# Patient Record
Sex: MALE | Race: WHITE | NOT HISPANIC OR LATINO | URBAN - METROPOLITAN AREA
[De-identification: names, ages, dates, MRNs, and addresses within clinical notes are randomized per-mention and may not be internally consistent; named-entity substitution may affect disease eponyms.]

---

## 2017-06-26 ENCOUNTER — TELEPHONE (OUTPATIENT)
Dept: PEDIATRICS | Facility: CLINIC | Age: 47
End: 2017-06-26

## 2017-06-26 NOTE — TELEPHONE ENCOUNTER
Pt. Wondering if he can switch back to oral narcotic pain  medication as he feels he not getting much benefit out of the Suboxone for his LBP. Suboxone was prescribed by an  [facility] pain specialist. Triage asked if he discussed this with him. He stated yes but they are asking him to hold off a little longer.    Triage advised patient he should schedule an OV with PCP to discuss further; He stated he is not sure if he should schedule incase he may be turned away for asking for pain medication.     Triaged encouraged him to discuss with the [facility] provider for his options or to come in and discuss with a provider. He declined scheduling at this time.    He stated he will call back after thinking about his options and hung up.      Shiloh LAIRD RN, BSN, PHN  [facility]

## 2017-12-22 ENCOUNTER — NURSE TRIAGE (OUTPATIENT)
Dept: NURSING | Facility: CLINIC | Age: 47
End: 2017-12-22

## 2017-12-22 NOTE — TELEPHONE ENCOUNTER
"Patient states he had \"major back surgery\" done at [facility] [place] and his PCP told him that he can go to any ER to get an epidural done for back pain. I advised the it is up to the provider he sees in the ER to determine treatment or referrals needed and he would need to be seen in order to get that information. Verbalized understanding.  Abigail Loyd RN  [facility]    "

## 2018-10-22 ENCOUNTER — TELEPHONE (OUTPATIENT)
Dept: PEDIATRICS | Facility: CLINIC | Age: 48
End: 2018-10-22

## 2018-10-22 DIAGNOSIS — G89.29 CHRONIC LEFT-SIDED LOW BACK PAIN WITH LEFT-SIDED SCIATICA: Primary | ICD-10-CM

## 2018-10-22 DIAGNOSIS — M54.42 CHRONIC LEFT-SIDED LOW BACK PAIN WITH LEFT-SIDED SCIATICA: Primary | ICD-10-CM

## 2018-10-22 NOTE — TELEPHONE ENCOUNTER
Forwarded to triage nursing to investigate in more detail.  I haven't seen patient for 2 years.   Does [facility] need a referral from me?      Heidi Perez MD  Internal Medicine - Pediatrics

## 2018-10-22 NOTE — TELEPHONE ENCOUNTER
Yes he does need a referral from a [facility] Dr. This is the information from the  Pain clinic. He needs a evaluation done by them.  Please call the pt when it is in.    Home Phone [phone]   Mobile [phone]

## 2018-10-22 NOTE — TELEPHONE ENCOUNTER
Patient spoke with the  Pain Clinic who informed him that he needs a referral from one of our providers for this. Patient received a referral from his provider, Dr. Alvarado at [facility] but  Pain wouldn't accept that. Able to review notes from Dr. Alvarado's ov on 10/19/18 stating need for pain clinic referral. Please call patient back with questions/concerns.  -Emily Coelho

## 2018-10-30 ENCOUNTER — TELEPHONE (OUTPATIENT)
Dept: PALLIATIVE MEDICINE | Facility: CLINIC | Age: 48
End: 2018-10-30

## 2018-10-30 NOTE — TELEPHONE ENCOUNTER
Huddled with Dr. Ana Kern for referral.     Entered referral. Left voicemail updating patient that referral was entered and to call [phone] to inquire about an appointment.

## 2018-10-30 NOTE — TELEPHONE ENCOUNTER
[facility] Management Center Referral      1. Confirmed address with patient? Yes  2. Confirmed phone number with patient? Yes  3. Confirmed referring provider? Yes  4. Is the PCP the same as the referring provider? Yes  5. Has the patient been to any previous pain clinics? Yes  (If yes, send ANITRA with welcome letter)  6. Which insurance are we to bill for this appointment?  HP    7. Informed pt of cancellation (48 hour) policy? Yes    REGARDING OPIOID MEDICATIONS: We will always address appropriateness of opioid pain medications, but we generally will not automatically take on a prescribing role. When we do take on prescribing of opioids for chronic pain, it is in collaboration with the referring physician for an intermediate period of time (months), with an expectation that the primary physician or provider will assume the prescribing role if medications are effective at stable doses with demonstrated compliance. Therefore, please do not assume that your prescribing responsibilities end on the day of pain clinic consultation.  7. Informed pt of prescribing policy? Yes      8. Referring Provider: Heidi Perez     9. Criteria for Triage Eval:   -Missed/Failed 1st DUAL appointment? N/A   -Medication Focused? N/A   -Mental Health Concerns? (e.g. Recent psych hospitalization/snap shot)? N/A   -Active substance abuse? N/A   -Patient behaviors (e.g. Offensive language/raised voice)? N/A      Julien MARTINEZ    [facility]

## 2018-11-02 ENCOUNTER — OFFICE VISIT (OUTPATIENT)
Dept: URGENT CARE | Facility: URGENT CARE | Age: 48
End: 2018-11-02
Payer: COMMERCIAL

## 2018-11-02 VITALS
TEMPERATURE: 97.7 F | HEART RATE: 79 BPM | SYSTOLIC BLOOD PRESSURE: 124 MMHG | DIASTOLIC BLOOD PRESSURE: 80 MMHG | OXYGEN SATURATION: 97 %

## 2018-11-02 DIAGNOSIS — G89.29 OTHER CHRONIC BACK PAIN: Primary | ICD-10-CM

## 2018-11-02 DIAGNOSIS — M54.89 OTHER CHRONIC BACK PAIN: Primary | ICD-10-CM

## 2018-11-02 NOTE — MR AVS SNAPSHOT
"              After Visit Summary   11/2/2018    Nikolay Barnes    MRN: [id number]           Patient Information     Date Of Birth          1970        Visit Information        Provider Department      11/2/2018 12:35 PM Isak White MD [facility]        Today's Diagnoses     Other chronic back pain    -  1       Follow-ups after your visit        Your next 10 appointments already scheduled     Nov 07, 2018 10:00 AM CST   New Visit with Sophie Samuels MD   [facility] ([facility])    [address] [address] [address]   [phone]              Who to contact     If you have questions or need follow up information about today's clinic visit or your schedule please contact [facility] directly at [phone].  Normal or non-critical lab and imaging results will be communicated to you by MyChart, letter or phone within 4 business days after the clinic has received the results. If you do not hear from us within 7 days, please contact the clinic through MyChart or phone. If you have a critical or abnormal lab result, we will notify you by phone as soon as possible.  Submit refill requests through [employer] or call your pharmacy and they will forward the refill request to us. Please allow 3 business days for your refill to be completed.          Additional Information About Your Visit        MyChart Information     [employer] lets you send messages to your doctor, view your test results, renew your prescriptions, schedule appointments and more. To sign up, go to [email or web address] . Click on \"Log in\" on the left side of the screen, which will take you to the Welcome page. Then click on \"Sign up Now\" on the right side of the page.     You will be asked to enter the access code listed below, as well as some personal information. Please follow the directions to create your username and password.     Your access code is: " [email or web address]  Expires: 2019  6:46 PM     Your access code will  in 90 days. If you need help or a new code, please call your [facility] clinic or [phone].        Care EveryWhere ID     This is your Care EveryWhere ID. This could be used by other organizations to access your [facility] medical records  [id number]        Your Vitals Were     Pulse Temperature Pulse Oximetry             79 97.7  F (36.5  C) (Tympanic) 97%          Blood Pressure from Last 3 Encounters:   18 124/80   10/06/16 128/78   16 94/58    Weight from Last 3 Encounters:   10/06/16 151 lb (68.5 kg)   16 150 lb (68 kg)   07/15/16 150 lb (68 kg)              Today, you had the following     No orders found for display       Primary Care Provider Office Phone # Fax #    Heidi Perez [phone] [address] [place] [address]        Equal Access to Services     [facility]: Hadii aad ku hadasho Soomaali, waaxda luqadaha, qaybta kaalmada adeegyada, waxay virginiain hayjammie estes . So [facility] [phone].    ATENCIÓN: Si habla español, tiene a rios disposición servicios gratuitos de asistencia lingüística. Llame al [phone].    We comply with applicable federal civil rights laws and [place] laws. We do not discriminate on the basis of race, color, national origin, age, disability, sex, sexual orientation, or gender identity.            Thank you!     Thank you for choosing [facility]  for your care. Our goal is always to provide you with excellent care. Hearing back from our patients is one way we can continue to improve our services. Please take a few minutes to complete the written survey that you may receive in the mail after your visit with us. Thank you!             Your Updated Medication List - Protect others around you: Learn how to safely use, store and throw away your medicines at www.disposemymeds.org.          This list is accurate as of  11/2/18  6:46 PM.  Always use your most recent med list.                   Brand Name Dispense Instructions for use Diagnosis    traZODone 50 MG tablet    DESYREL    60 tablet    Take 1-2 tablets ( mg) by mouth nightly as needed for sleep    Persistent insomnia

## 2018-11-02 NOTE — PROGRESS NOTES
THIS IS A TRIAGE ENCOUNTER:  Nikolay Barnes is a 48 year old male--he has been seeing a physician at the [facility] for chronic pain management for the past 1.5-2 years--with a h/o chronic left low back pain with left sided sciatica and lumbosacral radiculopathy at L5 with a history of chronic opioid use for at least 15 years.   He is requesting strong pain medications.  Dr. Emre Alvarado, from the [facility], has been prescribing the patient Suboxone for the past 1.5-2 years.  This medication helped for the past 1.5-2 years but patient has not noticed much pain relief over the past couple months.      On October 19, 2018, patient was given a Rx for Tramadol 50 mg PO Q6 hours PRN pain. This prescription was written by Dr. Alvarado.  Patient ran out of the 28 tablets.  According to the patient, this Tramadol has not been effective to reduce the pain.      Patient will contact Dr. Emre Alvarado today at the [facility] for the pain, since he signed a controlled substance agreement at that clinic on October 26, 2016.      Isak White MD

## 2018-11-06 NOTE — PROGRESS NOTES
"                      [facility] Consultation    Date of visit: 11/6/2018    Reason for consultation:    Nikolay Barnes is a 48 year old male who is seen in consultation today at the request of his primary care physician, Heidi Perez     Consultation and Evaluation for: chronic back pain    Review of [place] Prescription Monitoring Program (): Today I have also reviewed the patient's history of controlled substance use, as provided by [place] licensed pharmacies and prescriber dispensers. No inconsistencies noted.     Review of Pain Questionnaire: Please see the [facility] health questionnaire, which the patient completed and reviewed with me in detail.    Review of Electronic Chart: Today I have also reviewed available medical information in the patient's medical record at [facility] ([facility]), including relevant provider notes, laboratory work, and imaging.     Nikolay Barnes has been seen at a pain clinic in the past. He has been to [facility] and [facility].     Chief Complaint:    Chief Complaint   Patient presents with     Pain     Pain history:  Nikolay Barnes is a 48 year old male who presents for initial evaluation of chief pain complaint of back pain.     Pain initially started in 2008. He had a laminectomy and discectomy at L5-S1 level. At that time he was having back and left lower extremity pain. He was doing well in terms of pain until a work injury in 2012 which resulted in a second disc herniation. It was recommended at that time that he have another surgery but he declined. From 2008 to around 2016 he was managing his pain with opioids and injections (tried epidurals and MBB). The epidurals provided variable benefit for him. The MBB didn't provide relief. He states that he did extensive research and thought that suboxone would be a better option for him to manage his pain instead of the opioids he was on. Per chart review \"He has tried a " "friend's suboxone in the past when he had run out of pain medications and it really took the withdrawal away, he took it for 3 days up to 12 mg per day, and also found that it really helped his pain\". He has been on suboxone for the past 1.5 to 2 years. He states now that the suboxone has not been working well to control his pain. He has tried various doses of suboxone and has found that it does not work well for him and that he needs to try something else. He recently had a prescription for Tramadol on 10/19/208 but this has not helped with pain relief. Today he states that he has done more research and he thinks that he should try methadone to see if it will provide more pain relief for him.       The back pain is axial in nature. He denies having any radiation into the lower extremities. It is constant. It is aching in quality. At its worst it is a 10/10 in severity, at best 5/10 in severity and on average is 8/10 in severity. Aggravated by sitting for a long time, staying in any one position for too long, working. Improved with resting. He reports he is getting behind at work because of the pain.      Red Flags: The patient denies bowel or bladder incontinence, parasthesias, weakness, saddle anesthesia, unintentional weight loss, or fever/chills/sweats.       Pain Treatments:  1. Medications:       Current pain medications:  Suboxone - has not taken for 2 days, most recently used 2 mg daily.  Tramadol - script written on 10/19/2018         Previous pain medications: (patient recall)  Lidocaine patches  Gabapentin  Flexeril  Robaxin  Oxycontin  Norco    2. Physical Therapy: Tried pool therapy which was helpful because of the warm water. He attempted land based PT which was too painful. Last tried in 2012.     TENS unit: Tried and not helpful  3. Pain psychology: No  4. Surgery: Laminectomy and discectomy at L5-S1 in 2008  5. Injections: Multiple epidural injections with variable benefit. Lumbar mbb without " relief.   6. Alternative Therapies:     Chiropractic: Tried in 2012 for a few months, not beneficial   Acupuncture: After first back surgery in 2008 and not helpful  Massage is sometimes helpful - last done one year ago  Hot tubs are helpful    Diagnostic tests:  MRI of lumbar spine was last completed on 1/8/2013 was reviewed with the patient and shows multi-level degenerative lumbar spondylosis. Advanced lumbosacral disc degeneration with evidence of previous left-sided hemilaminectomy and medial facectomy. Some residual osteophyte formation is seen on the left side, causing narrowing of the medial aspect to the left L5-S1 neural foramen. No lateralizing nerve root impingement at this time. There is a posterior and left-sided disc herniation, L4-5, causing marked impingement upon the left L5 nerve root with asymmetric indentation and deformity of the thecal sac. Disc degeneration at L3-4.     Labs:   No new labs.    Past Medical History:  Past Medical History:   Diagnosis Date     Chronic left-sided low back pain with left-sided sciatica 7/15/2016     Tobacco abuse        Past Surgical History:  No past surgical history on file.    Medications:  Current Outpatient Prescriptions   Medication Sig Dispense Refill     traZODone (DESYREL) 50 MG tablet Take 1-2 tablets ( mg) by mouth nightly as needed for sleep 60 tablet 5       Allergies:     Allergies   Allergen Reactions     Clonidine      Latex      Skin allergies       Social History:  Home situation: Lives in [place], [place] with daughter who is 25. He is  from his wife but she is living with him as she recently had a double mastectomy.  Occupation/Schooling: Works as  for [employer]  Tobacco use: Former smoker  Drug use: None  Alcohol use: None  History of chemical dependency treatment: None  Mental health admissions: No    Family history:  Family History   Problem Relation Age of Onset     Family History Negative Mother      Family History  Negative Father        Review of Systems:    POSTIVE IN BOLD  GENERAL: fever/chills, fatigue, general unwell feeling, weight gain/loss.  HEAD/EYES:  headache, dizziness, or vision changes.    EARS/NOSE/THROAT:  Nosebleeds, hearing loss, sinus infection, earache, tinnitus.  IMMUNE:  Allergies, cancer, immune deficiency, or infections.  SKIN:  Urticaria, rash, hives  HEME/Lymphatic:   anemia, easy bruising, easy bleeding.  RESPIRATORY:  cough, wheezing, or shortness of breath  CARDIOVASCULAR/Circulation:  Extremity edema, syncope, hypertension, tachycardia, or angina.  GASTROINTESTINAL:  abdominal pain, nausea/emesis, diarrhea, constipation,  hematochezia, or melena.  ENDOCRINE:  Diabetes, steroid use,  thyroid disease or osteoporosis.  MUSCULOSKELETAL: neck pain, back pain, arthralgia, arthritis, or gout.  GENITOURINARY:  frequency, urgency, dysuria, difficulty voiding, hematuria or incontinence.  NEUROLOGIC:  weakness, numbness, paresthesias, seizure, tremor, stroke or memory loss.  PSYCHIATRIC:  depression, anxiety, stress, suicidal thoughts or mood swings.     Physical Exam:  Vitals:    11/07/18 0945   BP: 130/83   Pulse: 78   SpO2: 99%   Weight: 68.5 kg (151 lb)     Exam:  Constitutional: Well developed, well nourished, appears stated age.  HEENT: Head atraumatic, normocephalic. Eyes without conjunctival injection or jaundice. Oropharynx clear. Neck supple. No obvious neck masses.  Respiratory: Breathing unlabored on room air.  Gastrointestinal: Abdomen soft, non-tender  Skin: No rash, lesions, or petechiae of exposed skin.   Extremities:  No clubbing, cyanosis, or edema.  Psychiatric/mental status: Alert, without lethargy or stupor. Pressured speech during encounter. Able to follow commands without difficulty.     Musculoskeletal exam:  Gait/Station/Posture: Slow to stand from a seated position. Gait antalgic.   Normal bulk and tone. Unremarkable spinal curvature. ASIS heights even.     Lumbar spine:  Range of  motion restricted in all planes due to pain.   Myofascial tenderness:  Bilateral lower lumbar paraspinal tenderness  Pain with laying in supine position. Pain in the back with hip range of motion.    Neurologic exam:  CN:  Cranial nerves 2-12 are grossly intact  Motor Strength:  5/5 symmetric LE strength       Reflexes:     Patella L4:  R:  2/4  L: 2/4   Achilles S1:  R:  0/4  L: 0/4    No ankle clonus bilaterally    Sensory:  (upper and lower extremities):   Light touch: normal    Allodynia: absent    Hyperalgesia: absent     DIRE Score for ongoing opioid management is calculated as follows:   Diagnosis = 2 pts (slowly progressive; moderate pain/objective findings)   Intractability = 1 pt (few therapies tried; passive patient role)   Risk    Psych = 2 pts (personality dysfunction/mental illness that moderately interferes with care)    Chem Hlth = 2 pts (use of medications to cope with stress; chemical dependency in remission)   Reliability = 1 pt (medication misuse; missed appointments; rarely follows through)   Social = 2 pts (reduction in some relationships/life rolls)   (Psych + Chem hlth + Reliability + Social) = 10     Efficacy = 2 pts (moderate benefit/function; low med dose; too early/not tried meds)         DIRE Score = 12        7-13: likely NOT suitable candidate for long-term opioid analgesia       14-21: may be a suitable candidate for long-term opioid analgesia     Assessment/Recommendations:  The patient is a 48 year old male with a past medical history significant for low back pain with radiation into the left lower extremity s/p L5-S1 laminectomy and discectomy in 2008. He did well after his surgery but then had worsened pain after a work injury in 2012 resulting in a disc herniation which was treated conservatively with medications and injections. He has a history of chronic opioid use. In 2016 he researched suboxone and decided that it would be a better option to help manage his pain. He has been  "on suboxone for the past 1.5-2 years. Initially he found that it helped with pain control but now he feels that it is less effective. He has done more research and today states that he thinks methadone will be a good option for him to try for pain relief.     After completing my evaluation I discussed with him that I do not recommend starting methadone for management of his pain. However, we can discuss other medication options to help manage chronic axial back pain. At this point the patient became visibly upset and raised his voice. His expectation for the visit today was that he will be getting methadone. I attempted to explain to him the benefits of using a multidisciplinary approach for chronic pain. Using the appropriate medications is a part of that approach but I do not recommend starting methadone as part of that plan. He immediately cut me off and stated he is not interested in any of that. He demanded to speak with \"someone in charge\". At that point I left the room and our patient care supervisor, Priti Patel, spoke with him at length (please refer to her nursing note for details of that conversation).    At the end of the visit he was given information regarding pain PT and pain psychology and it was re-iterated that if  he would like to participate in a multidisciplinary approach for his pain then other medication options can be discussed.     I spent 60 minutes of time face to face with the patient.  Greater than 50% of this time was spent in patient counseling and/or coordination of care regarding principles of multidisciplinary care, medication management, and treatment options as discussed above.     Sophie Samuels MD  [facility]   "

## 2018-11-07 ENCOUNTER — OFFICE VISIT (OUTPATIENT)
Dept: PALLIATIVE MEDICINE | Facility: CLINIC | Age: 48
End: 2018-11-07
Attending: PEDIATRICS
Payer: COMMERCIAL

## 2018-11-07 VITALS
DIASTOLIC BLOOD PRESSURE: 83 MMHG | HEART RATE: 78 BPM | OXYGEN SATURATION: 99 % | WEIGHT: 151 LBS | SYSTOLIC BLOOD PRESSURE: 130 MMHG | BODY MASS INDEX: 21.67 KG/M2

## 2018-11-07 DIAGNOSIS — M54.50 CHRONIC BILATERAL LOW BACK PAIN WITHOUT SCIATICA: Primary | ICD-10-CM

## 2018-11-07 DIAGNOSIS — G89.29 CHRONIC BILATERAL LOW BACK PAIN WITHOUT SCIATICA: Primary | ICD-10-CM

## 2018-11-07 PROCEDURE — 99205 OFFICE O/P NEW HI 60 MIN: CPT | Performed by: PHYSICAL MEDICINE & REHABILITATION

## 2018-11-07 RX ORDER — DEXTROAMPHETAMINE SACCHARATE, AMPHETAMINE ASPARTATE, DEXTROAMPHETAMINE SULFATE AND AMPHETAMINE SULFATE 5; 5; 5; 5 MG/1; MG/1; MG/1; MG/1
TABLET ORAL
Refills: 0 | COMMUNITY
Start: 2018-10-11

## 2018-11-07 RX ORDER — DEXTROAMPHETAMINE SACCHARATE, AMPHETAMINE ASPARTATE, DEXTROAMPHETAMINE SULFATE AND AMPHETAMINE SULFATE 2.5; 2.5; 2.5; 2.5 MG/1; MG/1; MG/1; MG/1
TABLET ORAL
Refills: 0 | COMMUNITY
Start: 2018-11-01

## 2018-11-07 RX ORDER — QUETIAPINE FUMARATE 25 MG/1
TABLET, FILM COATED ORAL
Refills: 0 | COMMUNITY
Start: 2018-10-06

## 2018-11-07 RX ORDER — BUPRENORPHINE HYDROCHLORIDE AND NALOXONE HYDROCHLORIDE DIHYDRATE 8; 2 MG/1; MG/1
TABLET SUBLINGUAL
Refills: 2 | COMMUNITY
Start: 2018-11-01

## 2018-11-07 ASSESSMENT — PAIN SCALES - GENERAL: PAINLEVEL: EXTREME PAIN (8)

## 2018-11-07 NOTE — MR AVS SNAPSHOT
After Visit Summary   11/7/2018    Nikolay Barnes    MRN: [id number]           Patient Information     Date Of Birth          1970        Visit Information        Provider Department      11/7/2018 10:00 AM Sophie Samuels MD [facility]        Care Instructions    Thank you for coming to [facility] for your care. It is my goal to partner with you to help you reach your optimal state of health.     You were seen today for your back pain. As discussed during your visit these are the recommendations:    1. Medications: I do not think that starting on methadone is a good option for managing your pain. We can discuss other medication options that are not opioids if you are interested.   2. Therapies: If you are interested in working with our program starting with pain PT and pain psychology would be my recommendation.   Physical Therapy at the [facility] focuses on 3 main things.    1. Exercise--consistency vs intensity with a home exercise program consisting of stretching, strengthening exercises, and aerobic activity.   2. Self-care is important--focusing on learning how to pace your activities, energy conservation, relaxation, stress management, mindfulness.   3. The last one is body awareness--which includes looking at posture and body mechanics. Focuses on education and things that you can do to take care of yourself and assist you with managing your pain.     Pain psychology can help reduce physical and psychosocial triggers or reinforcers of pain by adapting thoughts, feelings and behaviors to reduce symptoms and increase quality of life.  Evidence indicates that the practice of relaxation, meditation, and mindfulness techniques can significantly affect pain levels and overall well being.    ----------------------------------------------------------------  Clinic Number:  [phone]   Call this number with any questions about your care  "and for scheduling assistance. Calls are returned Monday through Friday between 8 AM and 4:30 PM. We usually get back to you within 2 business days depending on the issue/request.     Yumiko Spears MD Medical Director  Cristina Ruiz, Clinic Administrator  OMER Gordon, RN-BC Patient Care Supervisor               Follow-ups after your visit        Who to contact     If you have questions or need follow up information about today's clinic visit or your schedule please contact [facility] directly at [phone].  Normal or non-critical lab and imaging results will be communicated to you by [employer], letter or phone within 4 business days after the clinic has received the results. If you do not hear from us within 7 days, please contact the clinic through [employer] or phone. If you have a critical or abnormal lab result, we will notify you by phone as soon as possible.  Submit refill requests through [employer] or call your pharmacy and they will forward the refill request to us. Please allow 3 business days for your refill to be completed.          Additional Information About Your Visit        [employer] Information     [employer] lets you send messages to your doctor, view your test results, renew your prescriptions, schedule appointments and more. To sign up, go to [email or web address] . Click on \"Log in\" on the left side of the screen, which will take you to the Welcome page. Then click on \"Sign up Now\" on the right side of the page.     You will be asked to enter the access code listed below, as well as some personal information. Please follow the directions to create your username and password.     Your access code is: [email or web address]  Expires: 2019  5:46 PM     Your access code will  in 90 days. If you need help or a new code, please call your [place] clinic or [phone].        Care EveryWhere ID     This is your Care EveryWhere ID. This could be used by other organizations to access " your [place] medical records  [id number]        Your Vitals Were     Pulse Pulse Oximetry BMI (Body Mass Index)             78 99% 21.67 kg/m2          Blood Pressure from Last 3 Encounters:   11/07/18 130/83   11/02/18 124/80   10/06/16 128/78    Weight from Last 3 Encounters:   11/07/18 68.5 kg (151 lb)   10/06/16 68.5 kg (151 lb)   08/11/16 68 kg (150 lb)              Today, you had the following     No orders found for display       Primary Care Provider Office Phone # Fax #    Heidi Perez [phone] [address] [place] [address]        Equal Access to Services     [facility]: Hadii esme vasquez hadmaryo Soaleksey, waaxda luqadaha, qaybta kaalmada adeegyada, alexys estes . So [facility] [phone].    ATENCIÓN: Si habla español, tiene a rios disposición servicios gratuitos de asistencia lingüística. [facility] [phone].    We comply with applicable federal civil rights laws and [place] laws. We do not discriminate on the basis of race, color, national origin, age, disability, sex, sexual orientation, or gender identity.            Thank you!     Thank you for choosing [facility]  for your care. Our goal is always to provide you with excellent care. Hearing back from our patients is one way we can continue to improve our services. Please take a few minutes to complete the written survey that you may receive in the mail after your visit with us. Thank you!             Your Updated Medication List - Protect others around you: Learn how to safely use, store and throw away your medicines at www.disposemymeds.org.          This list is accurate as of 11/7/18 11:02 AM.  Always use your most recent med list.                   Brand Name Dispense Instructions for use Diagnosis    * amphetamine-dextroamphetamine 20 MG per tablet    ADDERALL     TK 1 T PO BID        * amphetamine-dextroamphetamine 10 MG per tablet    ADDERALL     TK 1 T  PO BID        buprenorphine-naloxone 8-2 MG Subl sublingual tablet    SUBOXONE          FLUoxetine 20 MG capsule    PROzac     TK ONE C PO  QAM        QUEtiapine 25 MG tablet    SEROquel     TK 1 TO 2 TS PO HS        traZODone 50 MG tablet    DESYREL    60 tablet    Take 1-2 tablets ( mg) by mouth nightly as needed for sleep    Persistent insomnia       * Notice:  This list has 2 medication(s) that are the same as other medications prescribed for you. Read the directions carefully, and ask your doctor or other care provider to review them with you.

## 2018-11-07 NOTE — PATIENT INSTRUCTIONS
Thank you for coming to [facility] for your care. It is my goal to partner with you to help you reach your optimal state of health.     You were seen today for your back pain. As discussed during your visit these are the recommendations:    1. Medications: I do not think that starting on methadone is a good option for managing your pain. We can discuss other medication options that are not opioids if you are interested.   2. Therapies: If you are interested in working with our program starting with pain PT and pain psychology would be my recommendation.   Physical Therapy at the [facility] focuses on 3 main things.    1. Exercise--consistency vs intensity with a home exercise program consisting of stretching, strengthening exercises, and aerobic activity.   2. Self-care is important--focusing on learning how to pace your activities, energy conservation, relaxation, stress management, mindfulness.   3. The last one is body awareness--which includes looking at posture and body mechanics. Focuses on education and things that you can do to take care of yourself and assist you with managing your pain.     Pain psychology can help reduce physical and psychosocial triggers or reinforcers of pain by adapting thoughts, feelings and behaviors to reduce symptoms and increase quality of life.  Evidence indicates that the practice of relaxation, meditation, and mindfulness techniques can significantly affect pain levels and overall well being.    ----------------------------------------------------------------  Clinic Number:  [phone]   Call this number with any questions about your care and for scheduling assistance. Calls are returned Monday through Friday between 8 AM and 4:30 PM. We usually get back to you within 2 business days depending on the issue/request.     Yumiko Spears MD Medical Director  Cristina Ruiz, Clinic Administrator  OMER Gordon, RN-BC Patient Care  Supervisor

## 2018-11-07 NOTE — NURSING NOTE
Spoke with patient at the end of the visit per his and Dr. Samuels's request. In a raised voice, patient explained that he was not satisfied with the outcome of the visit. States that he believes that Methadone is what was needed in his plan of care now that suboxone was no longer working to effectively manage his pain. He was frustrated with completing a visit and then being told that this medication was not recommended. Listened to patient as he relayed his history in relation to medication use, pain level and social situation. Acknowledged his frustration and apologized if there was a misunderstanding about what to expect at this visit. Explained that he was referred for an evaluation for comprehensive services and that, in the pain specialist's medical opinion, methadone was not an appropriate medication for his pain situation. Let him know that there are other options for managing his pain that we would be happy to help him with. Let him know that he has a complex pain situation and, if there was a simple solution, he would have done that long ago.  Let him know that our experience shows that managing complex pain with a multidisciplinary approach provides the most success with managing pain.  Also let him know that if he was not interested in other treatments, we would certainly understand if he chose to go elsewhere.  Patient stated that he was not going to pay for the visit and I let him know that I would not be able to help him with that. Patient became less agitated over the course of about 25 minutes. Provided AVS to patient and let him know that I would relay his concerns to the clinic administrator and medical director. Pt apologized for his rude (his words) behavior and left the clinic.     OMER Gordon, RN-BC  Patient Care Supervisor/Care Coordinator  [facility]

## 2022-04-04 ENCOUNTER — MEDICAL CORRESPONDENCE (OUTPATIENT)
Dept: HEALTH INFORMATION MANAGEMENT | Facility: CLINIC | Age: 52
End: 2022-04-04
Payer: COMMERCIAL

## 2022-04-05 ENCOUNTER — TRANSCRIBE ORDERS (OUTPATIENT)
Dept: OTHER | Age: 52
End: 2022-04-05
Payer: COMMERCIAL

## 2022-04-05 DIAGNOSIS — F11.24 OPIOID DEPENDENCE WITH OPIOID-INDUCED MOOD DISORDER (H): ICD-10-CM

## 2022-04-05 DIAGNOSIS — M54.50 LOW BACK PAIN: ICD-10-CM

## 2022-04-05 DIAGNOSIS — G47.33 OBSTRUCTIVE SLEEP APNEA (ADULT) (PEDIATRIC): ICD-10-CM

## 2022-04-05 DIAGNOSIS — G89.4 CHRONIC PAIN SYNDROME: ICD-10-CM

## 2022-04-05 DIAGNOSIS — M62.830 MUSCLE SPASM OF BACK: ICD-10-CM

## 2022-04-05 DIAGNOSIS — F90.2 ATTENTION DEFICIT HYPERACTIVITY DISORDER, COMBINED TYPE: ICD-10-CM

## 2022-04-05 DIAGNOSIS — M51.06 INTERVERTEBRAL DISC DISORDERS WITH MYELOPATHY, LUMBAR REGION: Primary | ICD-10-CM

## 2022-04-05 DIAGNOSIS — F33.1 MAJOR DEPRESSIVE DISORDER, RECURRENT EPISODE, MODERATE (H): ICD-10-CM
